# Patient Record
Sex: FEMALE | Race: WHITE
[De-identification: names, ages, dates, MRNs, and addresses within clinical notes are randomized per-mention and may not be internally consistent; named-entity substitution may affect disease eponyms.]

---

## 2018-12-22 ENCOUNTER — HOSPITAL ENCOUNTER (EMERGENCY)
Dept: HOSPITAL 25 - UCEAST | Age: 38
Discharge: HOME | End: 2018-12-22
Payer: COMMERCIAL

## 2018-12-22 VITALS — DIASTOLIC BLOOD PRESSURE: 71 MMHG | SYSTOLIC BLOOD PRESSURE: 141 MMHG

## 2018-12-22 DIAGNOSIS — J45.909: Primary | ICD-10-CM

## 2018-12-22 PROCEDURE — G0463 HOSPITAL OUTPT CLINIC VISIT: HCPCS

## 2018-12-22 PROCEDURE — 99202 OFFICE O/P NEW SF 15 MIN: CPT

## 2018-12-22 NOTE — UC
Respiratory Complaint HPI





- HPI Summary


HPI Summary: 





39 yo female presents with a dry cough for the last 3 weeks. She was treated 

with a short course of steroids which did improve her symptoms for 2-3, but 

since finishing them about a week ago her cough has returned and feels worse 

than before. She does have a history of asthma and recently started advair by 

her PCP about 3 weeks ago when cough began. Also having sinus pain/pressure/

congestion and subjective fever for the last 3-4 days. Denies chills, sore 

throat, SOB, chest pain, n/v. She did just land from a plane ride into [place] 

from [place], but her symptoms started well before this. She has no personal 

hx or fam hx of blood clots. No OBC, cancer hx, recent surgery, and not 

currently pregnant.





- History of Current Complaint


Stated Complaint: COUGH


Time Seen by Provider: 12/22/18 21:51


Hx Obtained From: Patient


Onset/Duration: Gradual Onset


Timing: Constant


Severity Currently: None


Character: Cough: Nonproductive





- Allergies/Home Medications


Allergies/Adverse Reactions: 


 Allergies











Allergy/AdvReac Type Severity Reaction Status Date / Time


 


No Known Allergies Allergy   Verified 12/22/18 21:59











Home Medications: 


 Home Medications





Albuterol HFA INHALER* [Ventolin HFA Inhaler*] 1 puff PO Q4H PRN 12/22/18 [

History Confirmed 12/22/18]


Fluticasone-Salmeterol 100-50* [Advair Diskus 100-50*] 1 puff PO BID 12/22/18 [

History Confirmed 12/22/18]











PMH/Surg Hx/FS Hx/Imm Hx


Respiratory History: Asthma





- Surgical History


Surgical History: None





- Family History


Known Family History: Positive: Respiratory Disease





- Social History


Occupation: Employed Full-time


Lives: With Family


Alcohol Use: Occasionally


Substance Use Type: None


Smoking Status (MU): Never Smoked Tobacco





Review of Systems


All Other Systems Reviewed And Are Negative: Yes


Constitutional: Positive: Negative


Skin: Positive: Negative


Eyes: Positive: Negative


ENT: Positive: Nasal Discharge, Sinus Congestion, Sinus Pain/Tenderness


Respiratory: Positive: Cough


Cardiovascular: Positive: Negative


Gastrointestinal: Positive: Negative


Neurovascular: Positive: Negative


Neurological: Positive: Negative


Psychological: Positive: Negative





Physical Exam





- Summary


Physical Exam Summary: 





GENERAL: NAD. WDWN. 


SKIN: No rashes, sores, lesions, or open wounds.


HEENT:


            Head: AT/NC


            Eyes: Conjunctiva clear without inflammation or discharge.


            Ears: Hearing grossly normal. TMs intact, no bulging, erythema, or 

edema. 


            Nose: Nasal mucosa mildly swollen and erythematous with yellow 

discharge. TTP maxillary and frontal sinus. Positive post nasal drip


            Throat: Posterior oropharynx without exudates, erythema, or 

tonsillar enlargement.  Uvula midline.


NECK: Supple. Nontender. No lymphadenopathy. 


CHEST: Shallow breath sounds throughout due to pt poor indicatory effort. CTAB 

with deep breaths. No r/r. No accessory muscle use. Breathing comfortably and 

in no distress.


CV:  Without m/r/g. Pulses intact. Cap refill <2seconds


NEURO: Alert. 


PSYCH: Age appropriate behavior.


Triage Information Reviewed: Yes


Vital Signs: 





Vital Signs:











Temp Pulse Resp BP Pulse Ox


 


 97.4 F   115   16   141/71   98 


 


 12/22/18 21:55  12/22/18 21:55  12/22/18 21:55  12/22/18 21:55  12/22/18 21:55











Vital Signs Reviewed: Yes





Respiratory Course/Dx





- Course


Course Of Treatment: Pt took ibuprofen and used her albuterol right before 

arriving. Repeat HR and O2% were 101 and 98%, respectively. Elevated HR could 

be due to albuterol and/or illness. No risk factors for PE and suspicion very 

low as symptoms have been for 3 weeks. Discussed duoneb and CXR today with pt, 

but she declined and said that if her symptoms do not improve with anbx and 

cough suppressants will return. Advised to have low threshold to return or go 

to ED if she develops trouble breathing or worsening symptoms. Pt voiced 

understanding and is agreeable to plan.





- Differential Dx/Diagnosis


Provider Diagnosis: 


 Asthma








Discharge





- Sign-Out/Discharge


Documenting (check all that apply): Patient Departure


All imaging exams completed and their final reports reviewed: No Studies





- Discharge Plan


Condition: Stable


Disposition: HOME


Prescriptions: 


Azithromycin TAB* [Zithromax TAB (Z-REBECCA) 250 mg #6 tabs] 2 tab PO .TODAY, THEN 

1 DAILY #1 rebecca


Codeine Phosphate/Guaifenesin [Guaifen-Codeine 100-10 mg/5 ml] 5 ml PO BEDTIME 

PRN #35 ml MDD 5mL


 PRN Reason: Cough


Patient Education Materials:  Acute Bronchitis (ED)


Referrals: 


No Primary Care Phys,NOPCP [Primary Care Provider] - 


Additional Instructions: 


If you develop a fever, shortness of breath, chest pain, new or worsening 

symptoms - please call your PCP or go to the ED.


 


1) If your symptoms do not improve in 2-3 days, please be rechecked


2) Continue using your albuterol inhaler every 4-6hours





- Billing Disposition and Condition


Condition: STABLE


Disposition: Home





- Attestation Statements


Provider Attestation: 





I was available for consult. This patient was seen by the SHANT. The patient care 

was discussed with me but not  seen by or examined by me 


-Kyree Bello MD